# Patient Record
Sex: FEMALE | Race: WHITE | NOT HISPANIC OR LATINO | Employment: FULL TIME | URBAN - METROPOLITAN AREA
[De-identification: names, ages, dates, MRNs, and addresses within clinical notes are randomized per-mention and may not be internally consistent; named-entity substitution may affect disease eponyms.]

---

## 2020-12-31 ENCOUNTER — OFFICE VISIT (OUTPATIENT)
Dept: URGENT CARE | Facility: CLINIC | Age: 35
End: 2020-12-31
Payer: COMMERCIAL

## 2020-12-31 VITALS
HEART RATE: 88 BPM | BODY MASS INDEX: 21 KG/M2 | OXYGEN SATURATION: 96 % | HEIGHT: 64 IN | TEMPERATURE: 99 F | SYSTOLIC BLOOD PRESSURE: 137 MMHG | DIASTOLIC BLOOD PRESSURE: 88 MMHG | WEIGHT: 123 LBS

## 2020-12-31 DIAGNOSIS — U07.1 COVID-19 VIRUS DETECTED: ICD-10-CM

## 2020-12-31 DIAGNOSIS — U07.1 COVID-19 VIRUS INFECTION: Primary | ICD-10-CM

## 2020-12-31 LAB
CTP QC/QA: YES
SARS-COV-2 RDRP RESP QL NAA+PROBE: POSITIVE

## 2020-12-31 PROCEDURE — U0002 COVID-19 LAB TEST NON-CDC: HCPCS | Mod: QW,S$GLB,, | Performed by: PHYSICIAN ASSISTANT

## 2020-12-31 PROCEDURE — 99203 OFFICE O/P NEW LOW 30 MIN: CPT | Mod: TIER,S$GLB,, | Performed by: PHYSICIAN ASSISTANT

## 2020-12-31 PROCEDURE — U0002: ICD-10-PCS | Mod: QW,S$GLB,, | Performed by: PHYSICIAN ASSISTANT

## 2020-12-31 PROCEDURE — 99203 PR OFFICE/OUTPT VISIT, NEW, LEVL III, 30-44 MIN: ICD-10-PCS | Mod: TIER,S$GLB,, | Performed by: PHYSICIAN ASSISTANT

## 2020-12-31 NOTE — PATIENT INSTRUCTIONS
Your test was POSITIVE for COVID-19 (coronavirus).       Please isolate yourself at home.  You may leave home and/or return to work once the following conditions are met:    If you were not hospitalized and are not severely immunocompromised*:   More than 10 days since symptoms first appeared AND   More than 24 hours fever free without medications AND   Symptoms have improved     If you were hospitalized OR are severely immunocompromised*:   More than 20 days since symptoms first appeared   More than 24 hours fever free without medications   Symptoms have improved    If you had no symptoms but tested positive:   More than 10 days since the date of the first positive test (20 days if severely immunocompromised).   If you develop symptoms, then use the guidelines above.     *Definition of severely immunocompromised:  - Current chemotherapy for cancer  - Untreated HIV with CD4 count less than 200  - Combined primary immunodeficiency disorder  - Prednisone more than 20 mg per day for more than 14 days  - Post-transplant patients    Additional instructions:   Separate yourself from other people and animals in your home.   Call ahead before visiting your doctor.   Wear a facemask when around others.   Cover your coughs and sneezes.   Wash your hands often with soap and water; hand  can be used, too.   Avoid sharing personal household items.   Wipe down surfaces used daily.   Monitor your symptoms. Seek prompt medical attention if your illness is worsening (e.g., difficulty breathing).    Before seeking care, call your healthcare provider.   If you have a medical emergency and need to call 911, notify the dispatch personnel that you have, or are being evaluated for COVID-19. If possible, put on a facemask before emergency medical services arrive.        Contact Tracing    As one of the next steps, you will receive a call or text from the [place] [facility] ([facility]) COVID-19 Tracing Team.  See the contact information below so you know not to ignore the health departments call. It is important that you contact them back immediately so they can help.      Contact Tracer Number:  [phone]  Caller ID for most carriers: [place] [facility]     What is contact tracing?  · Contact tracing is a process that helps identify everyone who has been in close contact with an infected person. Contact tracers let those people know they may have been exposed and guide them on next steps. Confidentiality is important for everyone; no one will be told who may have exposed them to the virus.  · Your involvement is important. The more we know about where and how this virus is spreading, the better chance we have at stopping it from spreading further.  What does exposure mean?  · Exposure means you have been within 6 feet for more than 15 minutes with a person who has or had COVID-19.  What kind of questions do the contact tracers ask?  · A contact tracer will confirm your basic contact information including name, address, phone number, and next of kin, as well as asking about any symptoms you may have had. Theyll also ask you how you think you may have gotten sick, such as places where you may have been exposed to the virus, and people you were with. Those names will never be shared with anyone outside of that call, and will only be used to help trace and stop the spread of the virus.   I have privacy concerns. How will the state use my information?  · Your privacy about your health is important. All calls are completed using call centers that use the appropriate health privacy protection measures (HIPAA compliance), meaning that your patient information is safe. No one will ever ask you any questions related to immigration status. Your health comes first.   Do I have to participate?  · You do not have to participate, but we strongly encourage you to. Contact tracing can help us catch and control new outbreaks as  theyre developing to keep your friends and family safe.   What if I dont hear from anyone?  · If you dont receive a call within 24 hours, you can call the number above right away to inquire about your status. That line is open from 8:00 am - 8:00 p.m., 7 days a week.  Contact tracing saves lives! Together, we have the power to beat this virus and keep our loved ones and neighbors safe.    For more information see CDC link below.      https://www.cdc.gov/coronavirus/2019-ncov/hcp/guidance-prevent-spread.html#precautions        Sources:  [facility], [facility] and [facility]           Sincerely,     Lizette Brink PA-C

## 2020-12-31 NOTE — LETTER
[address] ? [place], [address] ? Phone [phone] ? Fax [phone]           Return to Work/School    Patient: Stephany Monge  YOB: 1985   Date: 12/31/2020      To Whom It May Concern:     Stephany Monge was in contact with/seen in my office on 12/31/2020. COVID-19 is present in our communities across the [facility]. Not all patients are eligible or appropriate to be tested. In this situation, your employee meets the following criteria:     Stephany Monge has met the criteria for COVID-19 testing and has a POSITIVE result. She can return to work once they are asymptomatic for 24 hours without the use of fever reducing medications AND at least ten days from the start of symptoms (or from the first positive result if they have no symptoms). Tentative return date is 01/11/2021.     If you have any questions or concerns, or if I can be of further assistance, please do not hesitate to contact me.     Sincerely,    Lizette Brink PA-C

## 2020-12-31 NOTE — PROGRESS NOTES
"Subjective:       Patient ID: Stephany Monge is a 35 y.o. female.    Vitals:  height is 5' 4" (1.626 m) and weight is 55.8 kg (123 lb). Her tympanic temperature is 99 °F (37.2 °C). Her blood pressure is 137/88 and her pulse is 88. Her oxygen saturation is 96%.     Chief Complaint: COVID-19 Concerns    Reports COVID exposure    Sinus Problem  This is a new problem. The current episode started today. The problem is unchanged. There has been no fever (99F). She is experiencing no pain. Associated symptoms include congestion and sinus pressure. Pertinent negatives include no chills, coughing, ear pain, neck pain, shortness of breath or sore throat. Past treatments include nothing.       Constitution: Negative for activity change, appetite change, chills, fatigue and fever.   HENT: Positive for congestion, postnasal drip and sinus pressure. Negative for ear pain, sinus pain, sore throat and trouble swallowing.    Neck: Negative for neck pain, neck stiffness and painful lymph nodes.   Cardiovascular: Negative for chest pain and passing out.   Respiratory: Negative for chest tightness, cough, shortness of breath and asthma.    Gastrointestinal: Negative for abdominal pain, nausea, vomiting and diarrhea.   Genitourinary: Negative for dysuria.   Musculoskeletal: Negative for pain, trauma, arthritis, back pain, muscle cramps and muscle ache.   Skin: Negative for rash.   Allergic/Immunologic: Negative for eczema and asthma.   Neurological: Negative for dizziness, light-headedness, passing out, altered mental status, numbness and tingling.   Hematologic/Lymphatic: Negative for swollen lymph nodes and easy bruising/bleeding. Does not bruise/bleed easily.   Psychiatric/Behavioral: Negative for altered mental status and confusion.       Objective:      Physical Exam   Constitutional: She is oriented to person, place, and time. She appears well-developed. She is cooperative.  Non-toxic appearance. She does not appear ill. No " distress.      Comments:Appears well   HENT:   Head: Normocephalic and atraumatic.   Ears:   Right Ear: Hearing normal.   Left Ear: Hearing normal.   Eyes: Conjunctivae and lids are normal. No scleral icterus.   Neck: Phonation normal. Neck supple.   Cardiovascular: Normal rate.   Pulmonary/Chest: Effort normal. No respiratory distress.    Comments: Speaking in clear full sentences, breathing is unlabored    Abdominal: Normal appearance.   Neurological: She is alert and oriented to person, place, and time. Coordination normal.   Skin: Skin is intact, not diaphoretic and not pale. Psychiatric: Her speech is normal and behavior is normal. Judgment and thought content normal.   Nursing note and vitals reviewed.        Assessment:       1. COVID-19 virus infection        Plan:         COVID-19 virus infection  -     POCT COVID-19 Rapid Screening      Results for orders placed or performed in visit on 12/31/20   POCT COVID-19 Rapid Screening   Result Value Ref Range    POC Rapid COVID Positive (A) Negative     Acceptable Yes           Patient Instructions   Your test was POSITIVE for COVID-19 (coronavirus).       Please isolate yourself at home.  You may leave home and/or return to work once the following conditions are met:    If you were not hospitalized and are not severely immunocompromised*:   More than 10 days since symptoms first appeared AND   More than 24 hours fever free without medications AND   Symptoms have improved     If you were hospitalized OR are severely immunocompromised*:   More than 20 days since symptoms first appeared   More than 24 hours fever free without medications   Symptoms have improved    If you had no symptoms but tested positive:   More than 10 days since the date of the first positive test (20 days if severely immunocompromised).   If you develop symptoms, then use the guidelines above.     *Definition of severely immunocompromised:  - Current chemotherapy for  cancer  - Untreated HIV with CD4 count less than 200  - Combined primary immunodeficiency disorder  - Prednisone more than 20 mg per day for more than 14 days  - Post-transplant patients    Additional instructions:   Separate yourself from other people and animals in your home.   Call ahead before visiting your doctor.   Wear a facemask when around others.   Cover your coughs and sneezes.   Wash your hands often with soap and water; hand  can be used, too.   Avoid sharing personal household items.   Wipe down surfaces used daily.   Monitor your symptoms. Seek prompt medical attention if your illness is worsening (e.g., difficulty breathing).    Before seeking care, call your healthcare provider.   If you have a medical emergency and need to call 911, notify the dispatch personnel that you have, or are being evaluated for COVID-19. If possible, put on a facemask before emergency medical services arrive.        Contact Tracing    As one of the next steps, you will receive a call or text from the [place] [facility] ([facility]) COVID-19 Tracing Team. See the contact information below so you know not to ignore the health departments call. It is important that you contact them back immediately so they can help.      Contact Tracer Number:  [phone]  Caller ID for most carriers: [facility]     What is contact tracing?  · Contact tracing is a process that helps identify everyone who has been in close contact with an infected person. Contact tracers let those people know they may have been exposed and guide them on next steps. Confidentiality is important for everyone; no one will be told who may have exposed them to the virus.  · Your involvement is important. The more we know about where and how this virus is spreading, the better chance we have at stopping it from spreading further.  What does exposure mean?  · Exposure means you have been within 6 feet for more than 15 minutes with a  person who has or had COVID-19.  What kind of questions do the contact tracers ask?  · A contact tracer will confirm your basic contact information including name, address, phone number, and next of kin, as well as asking about any symptoms you may have had. Theyll also ask you how you think you may have gotten sick, such as places where you may have been exposed to the virus, and people you were with. Those names will never be shared with anyone outside of that call, and will only be used to help trace and stop the spread of the virus.   I have privacy concerns. How will the state use my information?  · Your privacy about your health is important. All calls are completed using call centers that use the appropriate health privacy protection measures (HIPAA compliance), meaning that your patient information is safe. No one will ever ask you any questions related to immigration status. Your health comes first.   Do I have to participate?  · You do not have to participate, but we strongly encourage you to. Contact tracing can help us catch and control new outbreaks as theyre developing to keep your friends and family safe.   What if I dont hear from anyone?  · If you dont receive a call within 24 hours, you can call the number above right away to inquire about your status. That line is open from 8:00 am - 8:00 p.m., 7 days a week.  Contact tracing saves lives! Together, we have the power to beat this virus and keep our loved ones and neighbors safe.    For more information see CDC link below.      https://www.cdc.gov/coronavirus/2019-ncov/hcp/guidance-prevent-spread.html#precautions        Sources:  CDC, [place] [facility] and [facility]           Sincerely,     Lizette Brink PA-C

## 2021-05-04 ENCOUNTER — PATIENT MESSAGE (OUTPATIENT)
Dept: RESEARCH | Facility: HOSPITAL | Age: 36
End: 2021-05-04

## 2021-05-10 ENCOUNTER — PATIENT MESSAGE (OUTPATIENT)
Dept: RESEARCH | Facility: HOSPITAL | Age: 36
End: 2021-05-10